# Patient Record
Sex: MALE | Race: WHITE | NOT HISPANIC OR LATINO | Employment: OTHER | URBAN - METROPOLITAN AREA
[De-identification: names, ages, dates, MRNs, and addresses within clinical notes are randomized per-mention and may not be internally consistent; named-entity substitution may affect disease eponyms.]

---

## 2017-01-13 NOTE — PATIENT DISCUSSION
PCIOL OD - doing very well. Vision is excellent. Pt has one suture in operative wound. Helping vision right now. Leave in place.

## 2017-01-13 NOTE — PATIENT DISCUSSION
Continue: Refresh Optive Advanced ([id number]): drops: 0.5-1-0.5% every night into both eyes

## 2017-07-24 NOTE — PATIENT DISCUSSION
Continue: Refresh Optive Advanced ([id number]): drops: 0.5-1-0.5% every night into both eyes

## 2017-08-14 ENCOUNTER — LASIK CONSULTATION (OUTPATIENT)
Dept: URBAN - METROPOLITAN AREA CLINIC 43 | Facility: CLINIC | Age: 37
End: 2017-08-14

## 2017-08-14 DIAGNOSIS — H52.13: ICD-10-CM

## 2017-08-14 PROCEDURE — 99499LK REFRACTIVE CONSULT/LASIK

## 2017-08-14 ASSESSMENT — VISUAL ACUITY
OS_CC: J1
OD_SC: CF 6FT
OS_SC: CF 6FT
OD_CC: J1
OS_CC: 20/40+2
OD_CC: 20/30

## 2017-08-14 ASSESSMENT — TONOMETRY
OS_IOP_MMHG: 16
OD_IOP_MMHG: 17

## 2017-09-01 ENCOUNTER — LASIK POST OP (OUTPATIENT)
Dept: URBAN - METROPOLITAN AREA CLINIC 35 | Facility: CLINIC | Age: 37
End: 2017-09-01

## 2017-09-01 DIAGNOSIS — H40.013: ICD-10-CM

## 2017-09-01 DIAGNOSIS — Z98.890: ICD-10-CM

## 2017-09-01 PROCEDURE — 99024 POSTOP FOLLOW-UP VISIT: CPT

## 2017-09-01 PROCEDURE — 92133 CPTRZD OPH DX IMG PST SGM ON: CPT

## 2017-09-01 ASSESSMENT — KERATOMETRY
OS_K1POWER_DIOPTERS: 43.50
OD_AXISANGLE2_DEGREES: 098
OS_AXISANGLE2_DEGREES: 076
OD_K1POWER_DIOPTERS: 43.00
OD_K2POWER_DIOPTERS: 43.00
OS_K2POWER_DIOPTERS: 43.25

## 2017-09-01 ASSESSMENT — VISUAL ACUITY
OD_SC: 20/20
OD_SC: J1
OS_SC: 20/20-1
OS_SC: J1

## 2017-09-01 ASSESSMENT — TONOMETRY
OD_IOP_MMHG: 16
OS_IOP_MMHG: 16

## 2018-08-10 ENCOUNTER — ESTABLISHED COMPREHENSIVE EXAM (OUTPATIENT)
Dept: URBAN - METROPOLITAN AREA CLINIC 35 | Facility: CLINIC | Age: 38
End: 2018-08-10

## 2018-08-10 DIAGNOSIS — H40.013: ICD-10-CM

## 2018-08-10 DIAGNOSIS — H52.13: ICD-10-CM

## 2018-08-10 PROCEDURE — 92014 COMPRE OPH EXAM EST PT 1/>: CPT

## 2018-08-10 PROCEDURE — 92015 DETERMINE REFRACTIVE STATE: CPT

## 2018-08-10 PROCEDURE — 92133 CPTRZD OPH DX IMG PST SGM ON: CPT

## 2018-08-10 ASSESSMENT — KERATOMETRY
OD_AXISANGLE2_DEGREES: 098
OS_K1POWER_DIOPTERS: 43.50
OS_K2POWER_DIOPTERS: 43.25
OD_K1POWER_DIOPTERS: 43.00
OS_AXISANGLE2_DEGREES: 076
OD_K2POWER_DIOPTERS: 43.00

## 2018-08-10 ASSESSMENT — TONOMETRY
OD_IOP_MMHG: 13
OS_IOP_MMHG: 17

## 2018-08-10 ASSESSMENT — VISUAL ACUITY
OS_SC: 20/20-1
OD_SC: J1+
OS_SC: J1+
OD_SC: 20/20-2

## 2019-11-20 ENCOUNTER — ESTABLISHED COMPREHENSIVE EXAM (OUTPATIENT)
Dept: URBAN - METROPOLITAN AREA CLINIC 35 | Facility: CLINIC | Age: 39
End: 2019-11-20

## 2019-11-20 DIAGNOSIS — H40.013: ICD-10-CM

## 2019-11-20 DIAGNOSIS — H52.13: ICD-10-CM

## 2019-11-20 PROCEDURE — 92014 COMPRE OPH EXAM EST PT 1/>: CPT

## 2019-11-20 PROCEDURE — 92015 DETERMINE REFRACTIVE STATE: CPT

## 2019-11-20 PROCEDURE — 92133 CPTRZD OPH DX IMG PST SGM ON: CPT

## 2019-11-20 ASSESSMENT — VISUAL ACUITY
OD_SC: J1
OS_SC: 20/25+2
OD_SC: 20/25-1
OS_SC: J1

## 2019-11-20 ASSESSMENT — TONOMETRY
OD_IOP_MMHG: 16
OS_IOP_MMHG: 16

## 2020-03-11 NOTE — PATIENT DISCUSSION
POSTERIOR VITREOUS DETACHMENT, OU: RETURN FOR FOLLOW-UP AS SCHEDULED. PROVIDER:[TOKEN:[[id number]]]

## 2020-11-12 NOTE — PATIENT DISCUSSION
Stopped Today: Maxitrol (neomycin-polymyxin-dexameth): ointment: 3.5-10,000-0.1 mg-unit/g-% a small amount at bedtime as directed on eyelid 10-

## 2020-11-12 NOTE — PATIENT DISCUSSION
LESION LLL 1 lower nasal and 2 small lower central : I have discussed options with the patient, surgery versus follow. The risks, benefits, alternatives and alternatives include anesthesia, bleeding, infection, inflammation, swelling, bruising. The patient understands and desires to remove LLL lesion. The lesion will be sent to pathology to rule out cancer. An RX was given for Erythromycin ointment to be applied to the affected area twice a day until resolved.

## 2020-11-12 NOTE — PATIENT DISCUSSION
Stopped Today: erythromycin (erythromycin): ointment: 5 mg/gram (0.5 %) a small amount twice a day as directed on eyelid 11-

## 2020-11-12 NOTE — PATIENT DISCUSSION
New Prescription: Maxitrol (neomycin-polymyxin-dexameth): ointment: 3.5-10,000-0.1 mg-unit/g-% a small amount twice a day as directed on eyelid 11-

## 2020-11-24 ENCOUNTER — ESTABLISHED COMPREHENSIVE EXAM (OUTPATIENT)
Dept: URBAN - METROPOLITAN AREA CLINIC 35 | Facility: CLINIC | Age: 40
End: 2020-11-24

## 2020-11-24 DIAGNOSIS — H40.013: ICD-10-CM

## 2020-11-24 DIAGNOSIS — H52.13: ICD-10-CM

## 2020-11-24 PROCEDURE — 92015 DETERMINE REFRACTIVE STATE: CPT

## 2020-11-24 PROCEDURE — 92014 COMPRE OPH EXAM EST PT 1/>: CPT

## 2020-11-24 PROCEDURE — 92133 CPTRZD OPH DX IMG PST SGM ON: CPT

## 2020-11-24 ASSESSMENT — VISUAL ACUITY
OD_SC: J1
OS_SC: J1
OD_SC: 20/25
OS_SC: 20/20

## 2020-11-24 ASSESSMENT — TONOMETRY
OD_IOP_MMHG: 16
OS_IOP_MMHG: 16

## 2020-12-01 NOTE — PATIENT DISCUSSION
LESION LLL central and LLL lid margin (lash line) : I have discussed options with the patient, surgery versus follow. The risks, benefits, alternatives and alternatives include anesthesia, bleeding, infection, inflammation, swelling, bruising. The patient understands and desires to remove LLL lesion. The lesion will be sent to pathology to rule out cancer. An RX was given for Erythromycin ointment to be applied to the affected area twice a day until resolved.

## 2020-12-01 NOTE — PATIENT DISCUSSION
New Prescription: Maxitrol (neomycin-polymyxin-dexameth): ointment: 3.5-10,000-0.1 mg-unit/g-% a small amount twice a day as directed on eyelid 12-

## 2021-04-27 NOTE — PATIENT DISCUSSION
Use of eyelid scrubs and daily warm compresses encouraged. Preservative free artificial tears should be used on a daily basis. Continue using baby shampoo for cleansing.

## 2021-04-27 NOTE — PATIENT DISCUSSION
Prescribe warm compress daily. Use of preservative free artificial tears encouraged. Continue Maxitrol ointment as needed.

## 2021-10-26 NOTE — PATIENT DISCUSSION
Cataract extraction, phaco with IOL; OS(SN60WF +18.0 9/14/2015 Stacie Hector M.D.) Cataract extraction, phaco with IOL; NU([id number]+32.4 11/28/2016 Stacie Hector M.D. ).

## 2021-10-26 NOTE — PATIENT DISCUSSION
Use of eyelid scrubs and daily warm compresses encouraged. Preservative free artificial tears should be used on a daily basis.

## 2021-12-01 ENCOUNTER — ESTABLISHED COMPREHENSIVE EXAM (OUTPATIENT)
Dept: URBAN - METROPOLITAN AREA CLINIC 35 | Facility: CLINIC | Age: 41
End: 2021-12-01

## 2021-12-01 DIAGNOSIS — H40.013: ICD-10-CM

## 2021-12-01 DIAGNOSIS — H52.13: ICD-10-CM

## 2021-12-01 PROCEDURE — 92015 DETERMINE REFRACTIVE STATE: CPT

## 2021-12-01 PROCEDURE — 92014 COMPRE OPH EXAM EST PT 1/>: CPT

## 2021-12-01 PROCEDURE — 92250 FUNDUS PHOTOGRAPHY W/I&R: CPT

## 2021-12-01 ASSESSMENT — VISUAL ACUITY
OU_SC: J1
OU_SC: 20/20-1
OD_SC: J1
OD_PH: 20/25
OS_SC: 20/25
OS_SC: J1
OD_SC: 20/30

## 2021-12-01 ASSESSMENT — TONOMETRY
OS_IOP_MMHG: 16
OD_IOP_MMHG: 16

## 2022-01-13 ENCOUNTER — CONSULTATION (OUTPATIENT)
Dept: URBAN - METROPOLITAN AREA CLINIC 43 | Facility: CLINIC | Age: 42
End: 2022-01-13

## 2022-01-13 DIAGNOSIS — H40.1134: ICD-10-CM

## 2022-01-13 DIAGNOSIS — Z98.890: ICD-10-CM

## 2022-01-13 DIAGNOSIS — H04.123: ICD-10-CM

## 2022-01-13 PROCEDURE — 92020 GONIOSCOPY: CPT

## 2022-01-13 PROCEDURE — 76514 ECHO EXAM OF EYE THICKNESS: CPT

## 2022-01-13 PROCEDURE — 92250 FUNDUS PHOTOGRAPHY W/I&R: CPT

## 2022-01-13 PROCEDURE — 92004 COMPRE OPH EXAM NEW PT 1/>: CPT

## 2022-01-13 ASSESSMENT — TONOMETRY
OS_IOP_MMHG: 15
OD_IOP_MMHG: 16

## 2022-01-13 ASSESSMENT — VISUAL ACUITY
OS_SC: 20/30+2
OS_SC: J1
OD_SC: J1
OD_SC: 20/30

## 2022-01-13 ASSESSMENT — PACHYMETRY
OS_CT_UM: 565
OD_CT_UM: 551

## 2022-03-03 ENCOUNTER — FOLLOW UP (OUTPATIENT)
Dept: URBAN - METROPOLITAN AREA CLINIC 43 | Facility: CLINIC | Age: 42
End: 2022-03-03

## 2022-03-03 DIAGNOSIS — H40.1131: ICD-10-CM

## 2022-03-03 PROCEDURE — 92083 EXTENDED VISUAL FIELD XM: CPT

## 2022-03-03 PROCEDURE — 6585550 LASER TRABECULOPLASTY

## 2022-03-03 PROCEDURE — 92012 INTRM OPH EXAM EST PATIENT: CPT

## 2022-03-03 RX ORDER — PREDNISOLONE ACETATE 10 MG/ML: 1 SUSPENSION/ DROPS OPHTHALMIC

## 2022-03-03 ASSESSMENT — VISUAL ACUITY
OD_SC: 20/30-1
OS_SC: 20/25-1

## 2022-03-03 ASSESSMENT — TONOMETRY
OD_IOP_MMHG: 17
OS_IOP_MMHG: 16

## 2022-04-07 ENCOUNTER — FOLLOW UP (OUTPATIENT)
Dept: URBAN - METROPOLITAN AREA CLINIC 35 | Facility: CLINIC | Age: 42
End: 2022-04-07

## 2022-04-07 DIAGNOSIS — H40.1131: ICD-10-CM

## 2022-04-07 PROCEDURE — 92012 INTRM OPH EXAM EST PATIENT: CPT

## 2022-04-07 ASSESSMENT — VISUAL ACUITY
OD_SC: 20/25-2
OS_SC: 20/20
OU_SC: 20/20

## 2022-04-07 ASSESSMENT — TONOMETRY
OS_IOP_MMHG: 13
OD_IOP_MMHG: 13

## 2022-06-16 ENCOUNTER — FOLLOW UP (OUTPATIENT)
Dept: URBAN - METROPOLITAN AREA CLINIC 35 | Facility: CLINIC | Age: 42
End: 2022-06-16

## 2022-06-16 DIAGNOSIS — H40.1131: ICD-10-CM

## 2022-06-16 PROCEDURE — 99212 OFFICE O/P EST SF 10 MIN: CPT

## 2022-06-16 ASSESSMENT — VISUAL ACUITY
OU_SC: 20/20-1
OD_SC: 20/25+2
OS_SC: 20/20-2

## 2022-06-16 ASSESSMENT — TONOMETRY
OS_IOP_MMHG: 13
OD_IOP_MMHG: 13

## 2022-10-25 NOTE — PATIENT DISCUSSION
Cataract extraction, phaco with IOL; OS(SN60WF +18.0 9/14/2015 Stephanie Coronel M.D.) Cataract extraction, phaco with IOL; MA([id number]+60.6 11/28/2016 Stephanie Coronel M.D. ).

## 2022-12-15 ENCOUNTER — FOLLOW UP (OUTPATIENT)
Dept: URBAN - METROPOLITAN AREA CLINIC 35 | Facility: CLINIC | Age: 42
End: 2022-12-15

## 2022-12-15 PROCEDURE — 99212 OFFICE O/P EST SF 10 MIN: CPT

## 2022-12-15 ASSESSMENT — TONOMETRY
OD_IOP_MMHG: 13
OS_IOP_MMHG: 14

## 2022-12-15 ASSESSMENT — VISUAL ACUITY
OD_SC: 20/25
OU_SC: 20/20
OS_SC: 20/20-2

## 2023-12-26 ENCOUNTER — COMPREHENSIVE EXAM (OUTPATIENT)
Dept: URBAN - METROPOLITAN AREA CLINIC 47 | Facility: CLINIC | Age: 43
End: 2023-12-26

## 2023-12-26 DIAGNOSIS — H40.1131: ICD-10-CM

## 2023-12-26 DIAGNOSIS — H52.7: ICD-10-CM

## 2023-12-26 PROCEDURE — 92014 COMPRE OPH EXAM EST PT 1/>: CPT

## 2023-12-26 PROCEDURE — 92015 DETERMINE REFRACTIVE STATE: CPT

## 2023-12-26 ASSESSMENT — VISUAL ACUITY
OS_SC: 20/20
OD_SC: 20/20
OS_SC: J1
OD_SC: J1
OU_SC: J1
OU_SC: 20/20

## 2023-12-26 ASSESSMENT — TONOMETRY
OS_IOP_MMHG: 15
OD_IOP_MMHG: 16

## 2024-01-17 ENCOUNTER — TECH ONLY (OUTPATIENT)
Dept: URBAN - METROPOLITAN AREA CLINIC 47 | Facility: CLINIC | Age: 44
End: 2024-01-17

## 2024-01-17 DIAGNOSIS — H40.1131: ICD-10-CM

## 2024-01-17 PROCEDURE — 92083 EXTENDED VISUAL FIELD XM: CPT

## 2024-01-17 PROCEDURE — 99211T TECH SERVICE

## 2024-01-17 PROCEDURE — 92133 CPTRZD OPH DX IMG PST SGM ON: CPT

## 2024-12-13 ENCOUNTER — COMPREHENSIVE EXAM (OUTPATIENT)
Age: 44
End: 2024-12-13

## 2024-12-13 DIAGNOSIS — H52.7: ICD-10-CM

## 2024-12-13 PROCEDURE — 92014 COMPRE OPH EXAM EST PT 1/>: CPT

## 2024-12-13 PROCEDURE — 92015 DETERMINE REFRACTIVE STATE: CPT

## 2025-01-27 ENCOUNTER — TECH ONLY (OUTPATIENT)
Age: 45
End: 2025-01-27

## 2025-01-27 DIAGNOSIS — H40.1131: ICD-10-CM

## 2025-01-27 PROCEDURE — 99211T TECH SERVICE

## 2025-01-27 PROCEDURE — 92133 CPTRZD OPH DX IMG PST SGM ON: CPT

## 2025-01-27 PROCEDURE — 92083 EXTENDED VISUAL FIELD XM: CPT
